# Patient Record
Sex: FEMALE | Race: WHITE | NOT HISPANIC OR LATINO | Employment: UNEMPLOYED | URBAN - METROPOLITAN AREA
[De-identification: names, ages, dates, MRNs, and addresses within clinical notes are randomized per-mention and may not be internally consistent; named-entity substitution may affect disease eponyms.]

---

## 2023-06-13 ENCOUNTER — LAB (OUTPATIENT)
Dept: LAB | Facility: LAB | Age: 48
End: 2023-06-13
Payer: MEDICAID

## 2023-06-13 DIAGNOSIS — R30.0 DYSURIA: ICD-10-CM

## 2023-06-13 DIAGNOSIS — N30.01 ACUTE CYSTITIS WITH HEMATURIA: Primary | ICD-10-CM

## 2023-06-13 LAB
APPEARANCE, URINE: ABNORMAL
BACTERIA, URINE: ABNORMAL /HPF
BILIRUBIN, URINE: ABNORMAL
BLOOD, URINE: ABNORMAL
COLOR, URINE: ABNORMAL
GLUCOSE, URINE: NEGATIVE MG/DL
KETONES, URINE: NEGATIVE MG/DL
LEUKOCYTE ESTERASE, URINE: ABNORMAL
NITRITE, URINE: POSITIVE
PH, URINE: 5.5 (ref 5–8)
PROTEIN, URINE: ABNORMAL MG/DL
RBC, URINE: ABNORMAL /HPF (ref 0–5)
SPECIFIC GRAVITY, URINE: 1.02 (ref 1–1.03)
UROBILINOGEN, URINE: 8 MG/DL (ref 0–1.9)
WBC, URINE: ABNORMAL /HPF (ref 0–5)

## 2023-06-13 PROCEDURE — 87077 CULTURE AEROBIC IDENTIFY: CPT

## 2023-06-13 PROCEDURE — 87086 URINE CULTURE/COLONY COUNT: CPT

## 2023-06-13 PROCEDURE — 87186 SC STD MICRODIL/AGAR DIL: CPT

## 2023-06-13 PROCEDURE — 81001 URINALYSIS AUTO W/SCOPE: CPT

## 2023-06-13 RX ORDER — NITROFURANTOIN 25; 75 MG/1; MG/1
100 CAPSULE ORAL 2 TIMES DAILY
Qty: 14 CAPSULE | Refills: 0 | Status: SHIPPED | OUTPATIENT
Start: 2023-06-13 | End: 2023-06-20

## 2023-06-15 LAB — URINE CULTURE: ABNORMAL

## 2023-06-26 DIAGNOSIS — R30.0 DYSURIA: ICD-10-CM

## 2023-06-29 ENCOUNTER — LAB (OUTPATIENT)
Dept: LAB | Facility: LAB | Age: 48
End: 2023-06-29
Payer: MEDICAID

## 2023-06-29 DIAGNOSIS — N30.00 ACUTE CYSTITIS WITHOUT HEMATURIA: Primary | ICD-10-CM

## 2023-06-29 DIAGNOSIS — R30.0 DYSURIA: ICD-10-CM

## 2023-06-29 LAB
ALANINE AMINOTRANSFERASE (SGPT) (U/L) IN SER/PLAS: 16 U/L (ref 7–45)
ALBUMIN (G/DL) IN SER/PLAS: 4.3 G/DL (ref 3.4–5)
ALKALINE PHOSPHATASE (U/L) IN SER/PLAS: 62 U/L (ref 33–110)
ANION GAP IN SER/PLAS: 11 MMOL/L (ref 10–20)
APPEARANCE, URINE: ABNORMAL
ASPARTATE AMINOTRANSFERASE (SGOT) (U/L) IN SER/PLAS: 15 U/L (ref 9–39)
BACTERIA, URINE: ABNORMAL /HPF
BILIRUBIN TOTAL (MG/DL) IN SER/PLAS: 0.4 MG/DL (ref 0–1.2)
BILIRUBIN, URINE: NEGATIVE
BLOOD, URINE: ABNORMAL
CALCIUM (MG/DL) IN SER/PLAS: 9.7 MG/DL (ref 8.6–10.3)
CARBON DIOXIDE, TOTAL (MMOL/L) IN SER/PLAS: 28 MMOL/L (ref 21–32)
CHLORIDE (MMOL/L) IN SER/PLAS: 103 MMOL/L (ref 98–107)
CHOLESTEROL (MG/DL) IN SER/PLAS: 183 MG/DL (ref 0–199)
CHOLESTEROL IN HDL (MG/DL) IN SER/PLAS: 39.3 MG/DL
CHOLESTEROL/HDL RATIO: 4.7
COLOR, URINE: YELLOW
CREATININE (MG/DL) IN SER/PLAS: 1.07 MG/DL (ref 0.5–1.05)
ERYTHROCYTE DISTRIBUTION WIDTH (RATIO) BY AUTOMATED COUNT: 12.6 % (ref 11.5–14.5)
ERYTHROCYTE MEAN CORPUSCULAR HEMOGLOBIN CONCENTRATION (G/DL) BY AUTOMATED: 33.6 G/DL (ref 32–36)
ERYTHROCYTE MEAN CORPUSCULAR VOLUME (FL) BY AUTOMATED COUNT: 97 FL (ref 80–100)
ERYTHROCYTES (10*6/UL) IN BLOOD BY AUTOMATED COUNT: 4.36 X10E12/L (ref 4–5.2)
GFR FEMALE: 65 ML/MIN/1.73M2
GLUCOSE (MG/DL) IN SER/PLAS: 89 MG/DL (ref 74–99)
GLUCOSE, URINE: NEGATIVE MG/DL
HEMATOCRIT (%) IN BLOOD BY AUTOMATED COUNT: 42.5 % (ref 36–46)
HEMOGLOBIN (G/DL) IN BLOOD: 14.3 G/DL (ref 12–16)
KETONES, URINE: NEGATIVE MG/DL
LDL: 120 MG/DL (ref 0–99)
LEUKOCYTE ESTERASE, URINE: ABNORMAL
LEUKOCYTES (10*3/UL) IN BLOOD BY AUTOMATED COUNT: 10.5 X10E9/L (ref 4.4–11.3)
NITRITE, URINE: POSITIVE
PH, URINE: 6 (ref 5–8)
PLATELETS (10*3/UL) IN BLOOD AUTOMATED COUNT: 323 X10E9/L (ref 150–450)
POTASSIUM (MMOL/L) IN SER/PLAS: 3.8 MMOL/L (ref 3.5–5.3)
PROTEIN TOTAL: 7.4 G/DL (ref 6.4–8.2)
PROTEIN, URINE: ABNORMAL MG/DL
RBC, URINE: ABNORMAL /HPF (ref 0–5)
SODIUM (MMOL/L) IN SER/PLAS: 138 MMOL/L (ref 136–145)
SPECIFIC GRAVITY, URINE: 1.01 (ref 1–1.03)
SQUAMOUS EPITHELIAL CELLS, URINE: ABNORMAL /HPF
THYROTROPIN (MIU/L) IN SER/PLAS BY DETECTION LIMIT <= 0.05 MIU/L: 10.17 MIU/L (ref 0.44–3.98)
TRIGLYCERIDE (MG/DL) IN SER/PLAS: 119 MG/DL (ref 0–149)
UREA NITROGEN (MG/DL) IN SER/PLAS: 13 MG/DL (ref 6–23)
UROBILINOGEN, URINE: 2 MG/DL (ref 0–1.9)
VLDL: 24 MG/DL (ref 0–40)
WBC CLUMPS, URINE: ABNORMAL /HPF
WBC, URINE: >100 /HPF (ref 0–5)

## 2023-06-29 PROCEDURE — 87077 CULTURE AEROBIC IDENTIFY: CPT

## 2023-06-29 PROCEDURE — 87186 SC STD MICRODIL/AGAR DIL: CPT

## 2023-06-29 PROCEDURE — 81001 URINALYSIS AUTO W/SCOPE: CPT

## 2023-06-29 PROCEDURE — 87086 URINE CULTURE/COLONY COUNT: CPT

## 2023-06-29 RX ORDER — SULFAMETHOXAZOLE AND TRIMETHOPRIM 800; 160 MG/1; MG/1
1 TABLET ORAL 2 TIMES DAILY
Qty: 14 TABLET | Refills: 0 | Status: SHIPPED | OUTPATIENT
Start: 2023-06-29 | End: 2023-07-06

## 2023-06-30 LAB
ALBUMIN (MG/L) IN URINE: 109.2 MG/L
ALBUMIN/CREATININE (UG/MG) IN URINE: 153.2 UG/MG CRT (ref 0–30)
CREATININE (MG/DL) IN URINE: 71.3 MG/DL (ref 20–320)

## 2023-07-02 LAB — URINE CULTURE: ABNORMAL

## 2023-08-09 ENCOUNTER — TELEMEDICINE (OUTPATIENT)
Dept: PRIMARY CARE | Facility: CLINIC | Age: 48
End: 2023-08-09

## 2023-08-09 DIAGNOSIS — L02.91 ABSCESS: Primary | ICD-10-CM

## 2023-08-09 PROCEDURE — 99441 PR PHYS/QHP TELEPHONE EVALUATION 5-10 MIN: CPT | Performed by: REGISTERED NURSE

## 2023-08-09 RX ORDER — SULFAMETHOXAZOLE AND TRIMETHOPRIM 800; 160 MG/1; MG/1
1 TABLET ORAL 2 TIMES DAILY
Qty: 14 TABLET | Refills: 0 | Status: SHIPPED | OUTPATIENT
Start: 2023-08-09 | End: 2023-08-16

## 2023-08-30 PROBLEM — E10.65 TYPE 1 DIABETES MELLITUS WITH HYPERGLYCEMIA (MULTI): Status: ACTIVE | Noted: 2023-08-30

## 2023-08-30 PROBLEM — E04.9 GOITER: Status: ACTIVE | Noted: 2023-08-30

## 2023-08-30 PROBLEM — E03.9 HYPOTHYROIDISM: Status: ACTIVE | Noted: 2023-08-30

## 2023-08-30 PROBLEM — I77.6 VASCULITIS (CMS-HCC): Status: ACTIVE | Noted: 2023-08-30

## 2023-08-30 PROBLEM — F32.A DEPRESSION: Status: ACTIVE | Noted: 2023-08-30

## 2023-08-30 PROBLEM — M62.838 MUSCLE SPASMS OF NECK: Status: ACTIVE | Noted: 2023-08-30

## 2023-08-30 PROBLEM — E78.5 DYSLIPIDEMIA: Status: ACTIVE | Noted: 2023-08-30

## 2023-08-30 PROBLEM — L05.91 PILONIDAL CYST: Status: ACTIVE | Noted: 2023-08-30

## 2023-08-30 RX ORDER — INSULIN LISPRO 100 [IU]/ML
INJECTION, SOLUTION INTRAVENOUS; SUBCUTANEOUS
COMMUNITY
End: 2023-12-11

## 2023-08-30 RX ORDER — CEPHALEXIN 500 MG/1
1 CAPSULE ORAL EVERY 12 HOURS
COMMUNITY
Start: 2022-10-03

## 2023-08-30 RX ORDER — KETOROLAC TROMETHAMINE 10 MG/1
10 TABLET, FILM COATED ORAL EVERY 6 HOURS PRN
COMMUNITY
Start: 2015-01-30

## 2023-08-30 RX ORDER — BLOOD SUGAR DIAGNOSTIC
STRIP MISCELLANEOUS 4 TIMES DAILY
COMMUNITY
Start: 2019-02-12

## 2023-08-30 RX ORDER — LEVOFLOXACIN 500 MG/1
500 TABLET, FILM COATED ORAL DAILY
COMMUNITY
Start: 2022-09-28

## 2023-08-30 RX ORDER — LEVOTHYROXINE SODIUM 150 UG/1
1 TABLET ORAL DAILY
COMMUNITY

## 2023-08-30 RX ORDER — LEVOTHYROXINE SODIUM 175 UG/1
175 TABLET ORAL DAILY
COMMUNITY

## 2023-08-30 RX ORDER — CALCIUM CARB/VITAMIN D3/VIT K1 500-100-40
TABLET,CHEWABLE ORAL AS NEEDED
COMMUNITY

## 2023-08-30 RX ORDER — IBUPROFEN 600 MG/1
TABLET ORAL 3 TIMES DAILY PRN
COMMUNITY
Start: 2021-12-27

## 2023-08-30 RX ORDER — FLUCONAZOLE 150 MG/1
TABLET ORAL
COMMUNITY
Start: 2022-10-04

## 2023-08-30 RX ORDER — ONDANSETRON 4 MG/1
1 TABLET, ORALLY DISINTEGRATING ORAL EVERY 6 HOURS PRN
COMMUNITY
Start: 2022-09-28

## 2023-08-30 RX ORDER — LORATADINE 10 MG/1
TABLET ORAL
COMMUNITY
Start: 2022-04-28

## 2023-08-30 RX ORDER — METHYLPREDNISOLONE 4 MG/1
TABLET ORAL
COMMUNITY
Start: 2015-01-30

## 2023-08-30 RX ORDER — INSULIN ASPART 100 [IU]/ML
INJECTION, SOLUTION INTRAVENOUS; SUBCUTANEOUS
COMMUNITY
Start: 2015-01-18 | End: 2024-03-21 | Stop reason: SDUPTHER

## 2023-08-30 RX ORDER — INSULIN GLARGINE 100 [IU]/ML
INJECTION, SOLUTION SUBCUTANEOUS
COMMUNITY
Start: 2022-08-25

## 2023-08-30 RX ORDER — TRAMADOL HYDROCHLORIDE 50 MG/1
TABLET ORAL EVERY 12 HOURS
COMMUNITY
Start: 2022-09-28

## 2023-08-30 RX ORDER — LEVOTHYROXINE SODIUM 137 UG/1
137 TABLET ORAL DAILY
COMMUNITY

## 2023-08-30 RX ORDER — SULFAMETHOXAZOLE AND TRIMETHOPRIM 800; 160 MG/1; MG/1
1 TABLET ORAL 2 TIMES DAILY
COMMUNITY
Start: 2022-11-22

## 2023-12-08 DIAGNOSIS — E10.65 TYPE 1 DIABETES MELLITUS WITH HYPERGLYCEMIA (MULTI): Primary | ICD-10-CM

## 2023-12-11 ENCOUNTER — TELEPHONE (OUTPATIENT)
Dept: PRIMARY CARE | Facility: CLINIC | Age: 48
End: 2023-12-11

## 2023-12-11 RX ORDER — INSULIN LISPRO 100 [IU]/ML
INJECTION, SOLUTION INTRAVENOUS; SUBCUTANEOUS
Qty: 60 ML | Refills: 0 | Status: SHIPPED | OUTPATIENT
Start: 2023-12-11 | End: 2024-03-15 | Stop reason: SDUPTHER

## 2024-03-15 DIAGNOSIS — E10.65 TYPE 1 DIABETES MELLITUS WITH HYPERGLYCEMIA (MULTI): ICD-10-CM

## 2024-03-15 RX ORDER — INSULIN LISPRO 100 [IU]/ML
INJECTION, SOLUTION INTRAVENOUS; SUBCUTANEOUS
Qty: 60 ML | Refills: 0 | Status: SHIPPED | OUTPATIENT
Start: 2024-03-15 | End: 2024-03-19 | Stop reason: SDUPTHER

## 2024-03-19 DIAGNOSIS — E10.65 TYPE 1 DIABETES MELLITUS WITH HYPERGLYCEMIA (MULTI): ICD-10-CM

## 2024-03-19 RX ORDER — INSULIN LISPRO 100 [IU]/ML
INJECTION, SOLUTION INTRAVENOUS; SUBCUTANEOUS
Qty: 60 ML | Refills: 0 | Status: SHIPPED | OUTPATIENT
Start: 2024-03-19 | End: 2024-04-11 | Stop reason: CLARIF

## 2024-03-21 DIAGNOSIS — E10.65 TYPE 1 DIABETES MELLITUS WITH HYPERGLYCEMIA (MULTI): Primary | ICD-10-CM

## 2024-03-21 RX ORDER — INSULIN ASPART 100 [IU]/ML
INJECTION, SOLUTION INTRAVENOUS; SUBCUTANEOUS
Qty: 60 ML | Refills: 0 | Status: SHIPPED | OUTPATIENT
Start: 2024-03-21

## 2024-07-09 NOTE — PROGRESS NOTES
"HPI   49 yo presents as new pt for metabolic management.  Pt with dm1 (dx age 7, + retinopathy), hypothyroidism, dyslipidemia, depression/ptsd, vasculitis.  A1c is 10.4%.    Sugars running in 200-300 range.  Pt confident with carb count.  Pt physical at work.  Cost of meds/supplies is the issue.    Tslim pump (not currently with dexcom can't get covered currently, A1c down to 7% with sensor)  Basal:  12am-1.1  7am-1.5    I:C 1:15, noon-4pm 1:12  Isf-60  Target-120    Taking levothyroxine 175 mcg every day, euthroid, no obstructive sx.    Not currently on a statin, had been in the past.    PMH/PSH:  -as above  -pilonidal cyst    SH:  + tobacco, 1 ppd, rare alcohol    FH:  Mom-cervical cancer, htn, cvd, pacemaker  Dad-unknown        Current Outpatient Medications:     blood sugar diagnostic (Contour Next Test Strips) strip, 4 times a day.  TEST 4 TIMES DAILY., Disp: , Rfl:     glucagon (Glucagen) 1 mg injection, inject 1 mg by intramuscular route as needed for severe hypoglycemia, Disp: , Rfl:     ibuprofen (IBU) 600 mg tablet, Take by mouth 3 times a day as needed. 1 tab(s) orally 3 times a day, As Needed - for mild pain, Disp: , Rfl:     insulin glargine (Lantus U-100 Insulin) 100 unit/mL injection, Inject under the skin.  INJECT 30 UNIT Daily if insulin pump off, Disp: , Rfl:     insulin syringe-needle U-100 31G X 5/16\" 0.3 mL syringe, Inject under the skin if needed. Use as instructed, Disp: , Rfl:     levoFLOXacin (Levaquin) 500 mg tablet, Take 1 tablet (500 mg) by mouth once daily. as directed, Disp: , Rfl:     levothyroxine (Synthroid, Levoxyl) 175 mcg tablet, Take 1 tablet (175 mcg) by mouth once daily., Disp: , Rfl:     NovoLOG U-100 Insulin aspart 100 unit/mL injection, Continuous subcutaneous insulin pump infusion, 60 units per day., Disp: 60 mL, Rfl: 0    cephalexin (Keflex) 500 mg capsule, Take 1 capsule (500 mg) by mouth every 12 hours., Disp: , Rfl:     fluconazole (Diflucan) 150 mg tablet, Take by " "mouth. TAKE 1 TABLET NOW, AND REPEAT IN 4 DAYS., Disp: , Rfl:     ketorolac (Toradol) 10 mg tablet, Take 1 tablet (10 mg) by mouth every 6 hours if needed., Disp: , Rfl:     loratadine (Claritin) 10 mg tablet, Take by mouth.  TAKE 1 TABLET BY MOUTH EVERY DAY AS NEEDED FOR ALLERGY SYMPTOM, Disp: , Rfl:     methylPREDNISolone (Medrol Dospak) 4 mg tablets, As Instructed per package, Disp: , Rfl:     ondansetron ODT (Zofran-ODT) 4 mg disintegrating tablet, Take 1 tablet (4 mg) by mouth every 6 hours if needed., Disp: , Rfl:     sulfamethoxazole-trimethoprim (Bactrim DS) 800-160 mg tablet, Take 1 tablet by mouth 2 times a day., Disp: , Rfl:     traMADol (Ultram) 50 mg tablet, Take by mouth every 12 hours., Disp: , Rfl:       Allergies as of 07/10/2024 - Reviewed 07/10/2024   Allergen Reaction Noted    Codeine Hives and Unknown 01/30/2015    Penicillins Unknown and Other 01/30/2015         Review of Systems   Cardiology: Lightheadedness-denies.  Chest pain-denies.  Leg edema-denies.  Palpitations-denies.  Respiratory: Cough-denies. Shortness of breath-denies.  Wheezing-denies.  Gastroenterology: Constipation-denies.  Diarrhea-denies.  Heartburn-denies.  Endocrinology: Cold intolerance-denies.  Heat intolerance-denies.  Sweats-denies.  Neurology: Headache-denies.  Tremor-denies.  Neuropathy in extremities-denies.  Psychology: Low energy-denies.  Irritability-denies.  Sleep disturbances-denies.      /72   Pulse 79   Ht 1.549 m (5' 1\")   Wt 75 kg (165 lb 6.4 oz)   BMI 31.25 kg/m²       Labs:  Lab Results   Component Value Date    WBC 10.5 06/29/2023    RBC 4.36 06/29/2023    HGB 14.3 06/29/2023    HCT 42.5 06/29/2023     06/29/2023     Lab Results   Component Value Date    CALCIUM 9.7 06/29/2023    AST 15 06/29/2023    ALKPHOS 62 06/29/2023    BILITOT 0.4 06/29/2023    PROT 7.4 06/29/2023    ALBUMIN 4.3 06/29/2023     06/29/2023    K 3.8 06/29/2023     06/29/2023    CO2 28 06/29/2023    ANIONGAP " "11 06/29/2023    BUN 13 06/29/2023    CREATININE 1.07 (H) 06/29/2023    GLUCOSE 89 06/29/2023    ALT 16 06/29/2023     Lab Results   Component Value Date    CHOL 183 06/29/2023    TRIG 119 06/29/2023    HDL 39.3 (A) 06/29/2023     Lab Results   Component Value Date    MICROALBCREA 153.2 (H) 06/29/2023     Lab Results   Component Value Date    TSH 10.17 (H) 06/29/2023     No results found for: \"[id number]\"  Lab Results   Component Value Date    HGBA1C 10.4 (A) 07/10/2024         Physical Exam   General Appearance: pleasant, cooperative, no acute distress  HEENT: no chemosis, no proptosis, no lid lag, no lid retraction  Neck: no lymphadenopathy, no thyromegaly, no dominant thyroid nodules  Heart: no murmurs, regular rate and rhythm, S1 and S2  Lungs: no wheezes, no rhonci, no rales  Extremities: no lower extremity swelling      Assessment/Plan   1. Type 1 diabetes mellitus with hyperglycemia (Multi)  -A1c ordered and reviewed  -glycemic values reviewed  -labs reviewed    -issue is coverage of pump supplies and not being able to have sensor  -A1c was in the 7% range when on pump and dexcom 6    -emailed link for dexcom pt assistance, has 2 samples of dexcom g6  -made aware of [facility] PAP, given forms    -next visit once we have more data can more accurately determine appropriate pump settings    -will repeat full labs in near future once blood sugars stabilize  2. Hypothyroidism, unspecified type  -euthyroid on therapy, no change  -will need to repeat levels in near future  -refills sent    3. Dyslipidemia  -need repeat labs and back on statin in there near future      Follow Up:  veronica DonnellyD 3 months    Medical Decision Making  Complexity of problem: Chronic illness of diabetes mellitus uncontrolled, progressing  Data analyzed and reviewed: Reviewed prior notes, blood glucose data, labs including HgbA1c, lipids, serum chemistries.  Ordered tests.   Risk of complications and morbidities: Is definite because of use of " insulin and risk of hypoglycemia.  Prescription medications reviewed and modifications made.  Compliance assessed.  Addressed social determinants of health including food insecurity.

## 2024-07-10 ENCOUNTER — APPOINTMENT (OUTPATIENT)
Dept: ENDOCRINOLOGY | Facility: CLINIC | Age: 49
End: 2024-07-10
Payer: COMMERCIAL

## 2024-07-10 VITALS
DIASTOLIC BLOOD PRESSURE: 72 MMHG | HEIGHT: 61 IN | BODY MASS INDEX: 31.23 KG/M2 | SYSTOLIC BLOOD PRESSURE: 138 MMHG | WEIGHT: 165.4 LBS | HEART RATE: 79 BPM

## 2024-07-10 DIAGNOSIS — E78.5 DYSLIPIDEMIA: ICD-10-CM

## 2024-07-10 DIAGNOSIS — E10.65 TYPE 1 DIABETES MELLITUS WITH HYPERGLYCEMIA (MULTI): Primary | ICD-10-CM

## 2024-07-10 DIAGNOSIS — E03.9 HYPOTHYROIDISM, UNSPECIFIED TYPE: ICD-10-CM

## 2024-07-10 LAB — POC HEMOGLOBIN A1C: 10.4 % (ref 4.2–6.5)

## 2024-07-10 PROCEDURE — 99204 OFFICE O/P NEW MOD 45 MIN: CPT | Performed by: INTERNAL MEDICINE

## 2024-07-10 PROCEDURE — 83036 HEMOGLOBIN GLYCOSYLATED A1C: CPT | Performed by: INTERNAL MEDICINE

## 2024-07-10 PROCEDURE — 3078F DIAST BP <80 MM HG: CPT | Performed by: INTERNAL MEDICINE

## 2024-07-10 PROCEDURE — 3075F SYST BP GE 130 - 139MM HG: CPT | Performed by: INTERNAL MEDICINE

## 2024-07-10 RX ORDER — INSULIN ASPART 100 [IU]/ML
INJECTION, SOLUTION INTRAVENOUS; SUBCUTANEOUS
Qty: 100 ML | Refills: 1 | Status: SHIPPED | OUTPATIENT
Start: 2024-07-10

## 2024-07-10 RX ORDER — LEVOTHYROXINE SODIUM 175 UG/1
175 TABLET ORAL DAILY
Qty: 90 TABLET | Refills: 1 | Status: SHIPPED | OUTPATIENT
Start: 2024-07-10

## 2024-07-10 ASSESSMENT — ENCOUNTER SYMPTOMS
DEPRESSION: 0
LOSS OF SENSATION IN FEET: 0
OCCASIONAL FEELINGS OF UNSTEADINESS: 0

## 2024-07-10 ASSESSMENT — LIFESTYLE VARIABLES
AUDIT-C TOTAL SCORE: 1
SKIP TO QUESTIONS 9-10: 1
HOW OFTEN DO YOU HAVE SIX OR MORE DRINKS ON ONE OCCASION: NEVER
HOW OFTEN DO YOU HAVE A DRINK CONTAINING ALCOHOL: MONTHLY OR LESS
HOW MANY STANDARD DRINKS CONTAINING ALCOHOL DO YOU HAVE ON A TYPICAL DAY: 1 OR 2

## 2024-07-10 ASSESSMENT — PATIENT HEALTH QUESTIONNAIRE - PHQ9
2. FEELING DOWN, DEPRESSED OR HOPELESS: NOT AT ALL
1. LITTLE INTEREST OR PLEASURE IN DOING THINGS: NOT AT ALL
SUM OF ALL RESPONSES TO PHQ9 QUESTIONS 1 & 2: 0

## 2024-07-10 ASSESSMENT — PAIN SCALES - GENERAL: PAINLEVEL: 0-NO PAIN

## 2024-10-10 ENCOUNTER — TELEMEDICINE (OUTPATIENT)
Dept: PHARMACY | Facility: HOSPITAL | Age: 49
End: 2024-10-10
Payer: COMMERCIAL

## 2024-10-10 ENCOUNTER — APPOINTMENT (OUTPATIENT)
Dept: ENDOCRINOLOGY | Facility: CLINIC | Age: 49
End: 2024-10-10
Payer: COMMERCIAL

## 2024-10-10 VITALS
DIASTOLIC BLOOD PRESSURE: 64 MMHG | SYSTOLIC BLOOD PRESSURE: 123 MMHG | BODY MASS INDEX: 30.61 KG/M2 | WEIGHT: 162 LBS | HEART RATE: 72 BPM

## 2024-10-10 DIAGNOSIS — E10.65 TYPE 1 DIABETES MELLITUS WITH HYPERGLYCEMIA (MULTI): ICD-10-CM

## 2024-10-10 DIAGNOSIS — E03.9 HYPOTHYROIDISM, UNSPECIFIED TYPE: ICD-10-CM

## 2024-10-10 DIAGNOSIS — E10.65 TYPE 1 DIABETES MELLITUS WITH HYPERGLYCEMIA (MULTI): Primary | ICD-10-CM

## 2024-10-10 DIAGNOSIS — E78.5 DYSLIPIDEMIA: ICD-10-CM

## 2024-10-10 LAB — POC HEMOGLOBIN A1C: 11.2 % (ref 4.2–6.5)

## 2024-10-10 PROCEDURE — 99214 OFFICE O/P EST MOD 30 MIN: CPT | Performed by: INTERNAL MEDICINE

## 2024-10-10 PROCEDURE — 83036 HEMOGLOBIN GLYCOSYLATED A1C: CPT | Performed by: INTERNAL MEDICINE

## 2024-10-10 RX ORDER — BLOOD-GLUCOSE SENSOR
EACH MISCELLANEOUS
Qty: 3 EACH | Refills: 5 | Status: SHIPPED | OUTPATIENT
Start: 2024-10-10

## 2024-10-10 RX ORDER — INSULIN ASPART 100 [IU]/ML
INJECTION, SOLUTION INTRAVENOUS; SUBCUTANEOUS
Qty: 90 ML | Refills: 3 | Status: SHIPPED | OUTPATIENT
Start: 2024-10-10

## 2024-10-10 RX ORDER — BLOOD-GLUCOSE TRANSMITTER
EACH MISCELLANEOUS
Qty: 1 EACH | Refills: 1 | Status: SHIPPED | OUTPATIENT
Start: 2024-10-10

## 2024-10-10 NOTE — PROGRESS NOTES
"HPI     50 yo presented as new pt for metabolic management last visit,  here for follow up.  Pt with dm1 (dx age 7, + retinopathy), hypothyroidism, dyslipidemia, depression/ptsd, vasculitis.  A1c 10.4% last visit,  11.2% today.    Sugars running in 200-300 range.  Pt confident with carb count.  Pt physical at work.  Cost of meds/supplies is the issue.    Tslim pump (not currently with dexcom can't get covered currently, A1c down to 7% with sensor)  Basal:  12am-1.1  7am-1.5    I:C 1:15, noon-4pm 1:12  Isf-60  Target-120    Taking levothyroxine 175 mcg every day, euthroid, no obstructive sx.    Not currently on a statin, had been in the past.        Current Outpatient Medications:     blood sugar diagnostic (Contour Next Test Strips) strip, 4 times a day.  TEST 4 TIMES DAILY., Disp: , Rfl:     cephalexin (Keflex) 500 mg capsule, Take 1 capsule (500 mg) by mouth every 12 hours., Disp: , Rfl:     fluconazole (Diflucan) 150 mg tablet, Take by mouth. TAKE 1 TABLET NOW, AND REPEAT IN 4 DAYS., Disp: , Rfl:     glucagon (Glucagen) 1 mg injection, inject 1 mg by intramuscular route as needed for severe hypoglycemia, Disp: , Rfl:     ibuprofen (IBU) 600 mg tablet, Take by mouth 3 times a day as needed. 1 tab(s) orally 3 times a day, As Needed - for mild pain, Disp: , Rfl:     insulin glargine (Lantus U-100 Insulin) 100 unit/mL injection, Inject under the skin.  INJECT 30 UNIT Daily if insulin pump off, Disp: , Rfl:     insulin syringe-needle U-100 31G X 5/16\" 0.3 mL syringe, Inject under the skin if needed. Use as instructed, Disp: , Rfl:     ketorolac (Toradol) 10 mg tablet, Take 1 tablet (10 mg) by mouth every 6 hours if needed., Disp: , Rfl:     levoFLOXacin (Levaquin) 500 mg tablet, Take 1 tablet (500 mg) by mouth once daily. as directed, Disp: , Rfl:     levothyroxine (Synthroid, Levoxyl) 175 mcg tablet, Take 1 tablet (175 mcg) by mouth once daily., Disp: 90 tablet, Rfl: 1    loratadine (Claritin) 10 mg tablet, Take by " "mouth.  TAKE 1 TABLET BY MOUTH EVERY DAY AS NEEDED FOR ALLERGY SYMPTOM, Disp: , Rfl:     methylPREDNISolone (Medrol Dospak) 4 mg tablets, As Instructed per package, Disp: , Rfl:     NovoLOG U-100 Insulin aspart 100 unit/mL injection, Continuous subcutaneous insulin pump infusion, 100 units per day., Disp: 100 mL, Rfl: 1    ondansetron ODT (Zofran-ODT) 4 mg disintegrating tablet, Take 1 tablet (4 mg) by mouth every 6 hours if needed., Disp: , Rfl:     sulfamethoxazole-trimethoprim (Bactrim DS) 800-160 mg tablet, Take 1 tablet by mouth 2 times a day., Disp: , Rfl:     traMADol (Ultram) 50 mg tablet, Take by mouth every 12 hours., Disp: , Rfl:     Dexcom G6 Sensor device, As directed change sensor every 10 days, Disp: 3 each, Rfl: 5    Dexcom G6 Transmitter device, Use as instructed every 90 days, Disp: 1 each, Rfl: 1    Allergies as of 10/10/2024 - Reviewed 10/10/2024   Allergen Reaction Noted    Codeine Hives and Unknown 01/30/2015    Penicillins Unknown and Other 01/30/2015       /64   Pulse 72   Wt 73.5 kg (162 lb)   BMI 30.61 kg/m²     Labs:   Lab Results   Component Value Date    WBC 10.5 06/29/2023    RBC 4.36 06/29/2023    HGB 14.3 06/29/2023    HCT 42.5 06/29/2023     06/29/2023     Lab Results   Component Value Date    CALCIUM 9.7 06/29/2023    AST 15 06/29/2023    ALKPHOS 62 06/29/2023    BILITOT 0.4 06/29/2023    PROT 7.4 06/29/2023    ALBUMIN 4.3 06/29/2023     06/29/2023    K 3.8 06/29/2023     06/29/2023    CO2 28 06/29/2023    ANIONGAP 11 06/29/2023    BUN 13 06/29/2023    CREATININE 1.07 (H) 06/29/2023    GLUCOSE 89 06/29/2023    ALT 16 06/29/2023     Lab Results   Component Value Date    CHOL 183 06/29/2023    TRIG 119 06/29/2023    HDL 39.3 (A) 06/29/2023     Lab Results   Component Value Date    MICROALBCREA 153.2 (H) 06/29/2023     Lab Results   Component Value Date    TSH 10.17 (H) 06/29/2023     No results found for: \"[id number]\"  Lab Results   Component Value Date    " HGBA1C 11.2 (A) 10/10/2024         Assessment/Plan   1. Type 1 diabetes mellitus with hyperglycemia (Multi)    -A1c ordered and reviewed  -glycemic values reviewed  -labs reviewed,  new labs ordered     -issue is coverage of pump supplies and not being able to have sensor  -A1c was in the 7% range when on pump and dexcom 6    -erxs sent for g6 supplies to [facility] for PA assistance  -given contact info for Viky, pump , to meet for updating pump software to allow g7, given 2 samples unfortunately no g6 samples     -given sample varisoft infusion sets using & cartridges, paying cash price currently      Patient Assistance Screening (VAF)  Patient verbally reports monthly or yearly income which is less than 400% federal poverty level.  Application for program would be submitted for the following medications: novolog vials   - pt to provide copy of most recent 1040 Form to start application process      2. Hypothyroidism, unspecified type  -euthyroid on therapy, no change  -repeat labs before next visit     3. Dyslipidemia  -need repeat labs, ordered,  likely add back statin in the near future      Follow up: 6mon 30min bb    -labs/tests/notes reviewed  -reviewed and counseled patient on medication monitoring and side effects    Treatment and plan discussed with Dr. Baldwin.  TAPAN Bailey, PharmD, BC-ADM, [facility].     Medical Decision Making  Complexity of problem: Chronic illness of diabetes mellitus uncontrolled, progressing  Data analyzed and reviewed: Reviewed prior notes, blood glucose data, labs including HgbA1c, lipids, serum chemistries.  Ordered tests.   Risk of complications and morbidities: Is definite because of use of insulin and risk of hypoglycemia.  Prescription medications reviewed and modifications made.  Compliance assessed.  Addressed social determinants of health including food insecurity.

## 2024-10-10 NOTE — PROGRESS NOTES
"[facility]      Julia Vaca a 49 y.o. patient was referred to the Clinical Pharmacy Team for diabetes management.  Presents today via telephone for initial assessment and management.      Referring Provider: Timothy Baldwin MD       Pt with dm1 (dx age 7, + retinopathy), hypothyroidism, dyslipidemia, depression/ptsd, vasculitis.  A1c 10.4% last visit,  11.2% today.    Sugars running in 200-300 range.  Pt confident with carb count.  Pt physical at work.  Cost of meds/supplies is the issue.    Tslim pump (not currently with dexcom can't get covered currently, A1c down to 7% with sensor)  Basal:  12am-1.1  7am-1.5    I:C 1:15, noon-4pm 1:12  Isf-60  Target-120    Taking levothyroxine 175 mcg every day, euthroid, no obstructive sx.    Not currently on a statin, had been in the past.          Current Outpatient Medications:     blood sugar diagnostic (Contour Next Test Strips) strip, 4 times a day.  TEST 4 TIMES DAILY., Disp: , Rfl:     cephalexin (Keflex) 500 mg capsule, Take 1 capsule (500 mg) by mouth every 12 hours., Disp: , Rfl:     Dexcom G6 Sensor device, As directed change sensor every 10 days, Disp: 3 each, Rfl: 5    Dexcom G6 Transmitter device, Use as instructed every 90 days, Disp: 1 each, Rfl: 1    fluconazole (Diflucan) 150 mg tablet, Take by mouth. TAKE 1 TABLET NOW, AND REPEAT IN 4 DAYS., Disp: , Rfl:     glucagon (Glucagen) 1 mg injection, inject 1 mg by intramuscular route as needed for severe hypoglycemia, Disp: , Rfl:     ibuprofen (IBU) 600 mg tablet, Take by mouth 3 times a day as needed. 1 tab(s) orally 3 times a day, As Needed - for mild pain, Disp: , Rfl:     insulin glargine (Lantus U-100 Insulin) 100 unit/mL injection, Inject under the skin.  INJECT 30 UNIT Daily if insulin pump off, Disp: , Rfl:     insulin syringe-needle U-100 31G X 5/16\" 0.3 mL syringe, Inject under the skin if needed. Use as instructed, Disp: , Rfl:     ketorolac (Toradol) 10 mg tablet, Take 1 " tablet (10 mg) by mouth every 6 hours if needed., Disp: , Rfl:     levoFLOXacin (Levaquin) 500 mg tablet, Take 1 tablet (500 mg) by mouth once daily. as directed, Disp: , Rfl:     levothyroxine (Synthroid, Levoxyl) 175 mcg tablet, Take 1 tablet (175 mcg) by mouth once daily., Disp: 90 tablet, Rfl: 1    loratadine (Claritin) 10 mg tablet, Take by mouth.  TAKE 1 TABLET BY MOUTH EVERY DAY AS NEEDED FOR ALLERGY SYMPTOM, Disp: , Rfl:     methylPREDNISolone (Medrol Dospak) 4 mg tablets, As Instructed per package, Disp: , Rfl:     NovoLOG U-100 Insulin aspart 100 unit/mL injection, Continuous subcutaneous insulin pump infusion, 100 units per day., Disp: 100 mL, Rfl: 1    ondansetron ODT (Zofran-ODT) 4 mg disintegrating tablet, Take 1 tablet (4 mg) by mouth every 6 hours if needed., Disp: , Rfl:     sulfamethoxazole-trimethoprim (Bactrim DS) 800-160 mg tablet, Take 1 tablet by mouth 2 times a day., Disp: , Rfl:     traMADol (Ultram) 50 mg tablet, Take by mouth every 12 hours., Disp: , Rfl:      Allergies as of 10/10/2024 - Reviewed 10/10/2024   Allergen Reaction Noted    Codeine Hives and Unknown 01/30/2015    Penicillins Unknown and Other 01/30/2015     Lab Results   Component Value Date    HGBA1C 11.2 (A) 10/10/2024     Lab Results   Component Value Date    BILITOT 0.4 06/29/2023    CALCIUM 9.7 06/29/2023    CO2 28 06/29/2023     06/29/2023    CREATININE 1.07 (H) 06/29/2023    GLUCOSE 89 06/29/2023    ALKPHOS 62 06/29/2023    K 3.8 06/29/2023    PROT 7.4 06/29/2023     06/29/2023    AST 15 06/29/2023    ALT 16 06/29/2023    BUN 13 06/29/2023    ANIONGAP 11 06/29/2023    MG 2.12 12/15/2020    ALBUMIN 4.3 06/29/2023    GFRF 65 06/29/2023     Lab Results   Component Value Date    CHOL 183 06/29/2023    TRIG 119 06/29/2023    HDL 39.3 (A) 06/29/2023     Lab Results   Component Value Date    MICROALBCREA 153.2 (H) 06/29/2023        Patient Assistance Screening (VAF)  Patient verbally reports monthly or yearly  income which is less than 400% federal poverty level.  Application for program has been submitted for the following medications: Novolog  Patient has been informed that program team will be reaching out to them to discuss necessary documentation, instructed to answer phone/return voicemail.   Patient aware this process may take up to 6 weeks.   If approved medication must be filled through [facility] pharmacy and may be picked up or mailed to patient.    PATIENT EDUCATION/DISCUSSION:  - Counseled patient on MOA, expectations, side effects, duration of therapy, contraindications, administration, and monitoring parameters  - Answered all patient questions and concerns    Assessment/Plan   1. Type 1 diabetes mellitus with hyperglycemia (Multi)    1. Continue all meds under the continuation of care with the referring provider and clinical pharmacy team.  2. Prescription(s) sent to [facility] pharmacy for assistance on authorization and copay. Medication will be mailed to patient.    Follow up as scheduled with Dr. Baldwin & team in endocrinology office.  Follow up annually with clinical pharmacist for re-enrollment in [facility]    Thank you,   Cony Bailey, PharmD, BC-ADM, [facility]     Verbal consent to manage patient's drug therapy was obtained from the patient and/or an individual authorized to act on behalf of a patient. They were informed they may decline to participate or withdraw from participation in pharmacy services at any time.

## 2024-10-10 NOTE — PATIENT INSTRUCTIONS
Go for fasting blood work before next visit     Email Cony a copy most recent [id number] form for application to  Patient Assistance     Call Viky GARCIA to update pump software

## 2024-10-10 NOTE — PROGRESS NOTES
Attestation signed by Timothy Baldwin MD on 10/10/24 at 11:26 AM.    I, Dr Timothy Baldwin, have reviewed this progress note, medication list, vital signs, any pertinent lab values, and any CGM data if present with the [facility] and  face to face during this visit today. This note reflects the treatment plan that was made under my direction after reviewing the above mentioned elements while face to face with the patient and CDE.  I personally answered and addressed any questions and concerns the patient had during the visit today.  The CDE entered the data in this note under my direction and I personally reviewed it, signed any lab or medication orders that I instructed to be completed. I am the billing provider for this visit and the level of service was determined by my involvement in the Medical Decision Making Component of this visit while face to face with the patient.

## 2024-10-14 ENCOUNTER — TELEPHONE (OUTPATIENT)
Dept: ENDOCRINOLOGY | Facility: CLINIC | Age: 49
End: 2024-10-14
Payer: COMMERCIAL

## 2024-10-18 ENCOUNTER — TELEPHONE (OUTPATIENT)
Dept: ENDOCRINOLOGY | Facility: CLINIC | Age: 49
End: 2024-10-18
Payer: COMMERCIAL

## 2024-10-18 PROCEDURE — RXMED WILLOW AMBULATORY MEDICATION CHARGE

## 2024-10-22 ENCOUNTER — PHARMACY VISIT (OUTPATIENT)
Dept: PHARMACY | Facility: CLINIC | Age: 49
End: 2024-10-22
Payer: COMMERCIAL

## 2024-11-08 ENCOUNTER — TELEPHONE (OUTPATIENT)
Dept: ENDOCRINOLOGY | Facility: CLINIC | Age: 49
End: 2024-11-08
Payer: COMMERCIAL

## 2024-12-03 PROCEDURE — RXMED WILLOW AMBULATORY MEDICATION CHARGE

## 2024-12-05 ENCOUNTER — PHARMACY VISIT (OUTPATIENT)
Dept: PHARMACY | Facility: CLINIC | Age: 49
End: 2024-12-05
Payer: COMMERCIAL

## 2024-12-30 ENCOUNTER — TELEPHONE (OUTPATIENT)
Dept: ENDOCRINOLOGY | Facility: CLINIC | Age: 49
End: 2024-12-30
Payer: COMMERCIAL

## 2024-12-30 DIAGNOSIS — E10.65 TYPE 1 DIABETES MELLITUS WITH HYPERGLYCEMIA (MULTI): ICD-10-CM

## 2024-12-30 DIAGNOSIS — E03.9 HYPOTHYROIDISM, UNSPECIFIED TYPE: ICD-10-CM

## 2024-12-30 DIAGNOSIS — E03.9 HYPOTHYROIDISM, UNSPECIFIED TYPE: Primary | ICD-10-CM

## 2024-12-30 RX ORDER — LEVOTHYROXINE SODIUM 175 UG/1
175 TABLET ORAL DAILY
Qty: 90 TABLET | Refills: 1 | Status: SHIPPED | OUTPATIENT
Start: 2024-12-30

## 2024-12-30 NOTE — TELEPHONE ENCOUNTER
-received [facility] labs, tsh-63.7, ft4-0.7 on levothyroxine 175 mcg    -would appear pt not absorbing/taking meds as last wt was 162lbs    -have pt take 175mcg at bedtime (use a pill container to aide in compliance)    -repeat labs in 2 months (orders entered in  system

## 2025-01-17 PROCEDURE — RXMED WILLOW AMBULATORY MEDICATION CHARGE

## 2025-01-20 ENCOUNTER — PHARMACY VISIT (OUTPATIENT)
Dept: PHARMACY | Facility: CLINIC | Age: 50
End: 2025-01-20
Payer: COMMERCIAL

## 2025-02-12 PROCEDURE — RXMED WILLOW AMBULATORY MEDICATION CHARGE

## 2025-02-14 ENCOUNTER — PHARMACY VISIT (OUTPATIENT)
Dept: PHARMACY | Facility: CLINIC | Age: 50
End: 2025-02-14
Payer: COMMERCIAL

## 2025-03-14 PROCEDURE — RXMED WILLOW AMBULATORY MEDICATION CHARGE

## 2025-03-17 ENCOUNTER — PHARMACY VISIT (OUTPATIENT)
Dept: PHARMACY | Facility: CLINIC | Age: 50
End: 2025-03-17
Payer: COMMERCIAL

## 2025-04-10 ENCOUNTER — APPOINTMENT (OUTPATIENT)
Dept: ENDOCRINOLOGY | Facility: CLINIC | Age: 50
End: 2025-04-10
Payer: COMMERCIAL

## 2025-04-13 PROCEDURE — RXMED WILLOW AMBULATORY MEDICATION CHARGE

## 2025-04-15 ENCOUNTER — PHARMACY VISIT (OUTPATIENT)
Dept: PHARMACY | Facility: CLINIC | Age: 50
End: 2025-04-15
Payer: COMMERCIAL

## 2025-05-09 PROCEDURE — RXMED WILLOW AMBULATORY MEDICATION CHARGE

## 2025-05-15 ENCOUNTER — PHARMACY VISIT (OUTPATIENT)
Dept: PHARMACY | Facility: CLINIC | Age: 50
End: 2025-05-15
Payer: COMMERCIAL

## 2025-06-08 PROCEDURE — RXMED WILLOW AMBULATORY MEDICATION CHARGE

## 2025-06-10 ENCOUNTER — PHARMACY VISIT (OUTPATIENT)
Dept: PHARMACY | Facility: CLINIC | Age: 50
End: 2025-06-10
Payer: COMMERCIAL